# Patient Record
Sex: MALE | URBAN - METROPOLITAN AREA
[De-identification: names, ages, dates, MRNs, and addresses within clinical notes are randomized per-mention and may not be internally consistent; named-entity substitution may affect disease eponyms.]

---

## 2017-04-05 ENCOUNTER — IMPORTED ENCOUNTER (OUTPATIENT)
Dept: URBAN - METROPOLITAN AREA CLINIC 59 | Facility: CLINIC | Age: 52
End: 2017-04-05

## 2017-04-05 PROBLEM — L71.9 ROSACEA, UNSPECIFIED: Noted: 2017-04-05

## 2017-04-05 PROBLEM — H00.14 MEIBOMIAN GLAND CYST OF LEFT UPPER EYELID: Noted: 2017-04-05

## 2017-04-05 PROCEDURE — 99203 OFFICE O/P NEW LOW 30 MIN: CPT

## 2017-11-21 ENCOUNTER — APPOINTMENT (RX ONLY)
Dept: URBAN - METROPOLITAN AREA CLINIC 347 | Facility: CLINIC | Age: 52
Setting detail: DERMATOLOGY
End: 2017-11-21

## 2017-11-21 DIAGNOSIS — L30.8 OTHER SPECIFIED DERMATITIS: ICD-10-CM

## 2017-11-21 PROCEDURE — ? COUNSELING

## 2017-11-21 PROCEDURE — ? TREATMENT REGIMEN

## 2017-11-21 PROCEDURE — ? PRESCRIPTION

## 2017-11-21 PROCEDURE — 99202 OFFICE O/P NEW SF 15 MIN: CPT

## 2017-11-21 RX ORDER — CLOBETASOL PROPIONATE 0.5 MG/G
OINTMENT TOPICAL
Qty: 1 | Refills: 3 | Status: ERX | COMMUNITY
Start: 2017-11-21

## 2017-11-21 RX ADMIN — CLOBETASOL PROPIONATE: 0.5 OINTMENT TOPICAL at 21:16

## 2017-11-21 ASSESSMENT — LOCATION SIMPLE DESCRIPTION DERM
LOCATION SIMPLE: RIGHT HAND
LOCATION SIMPLE: LEFT HAND

## 2017-11-21 ASSESSMENT — LOCATION DETAILED DESCRIPTION DERM
LOCATION DETAILED: RIGHT ULNAR DORSAL HAND
LOCATION DETAILED: LEFT ULNAR DORSAL HAND

## 2017-11-21 ASSESSMENT — LOCATION ZONE DERM: LOCATION ZONE: HAND

## 2017-11-21 NOTE — PROCEDURE: TREATMENT REGIMEN
Plan: Cooler quicker showers
Initiate Treatment: Clobetasol ointment twice daily x 2 weeks then every other day
Otc Regimen: Cerave healing ointment several times daily
Samples Given: Cerave healing ointment
Detail Level: Zone

## 2023-10-21 ASSESSMENT — TONOMETRY
OS_IOP_MMHG: 14
OD_IOP_MMHG: 14

## 2023-10-21 ASSESSMENT — VISUAL ACUITY
OD_SC: 20/20-1
OS_SC: 20/30

## 2024-12-13 ENCOUNTER — NEW PATIENT COMPREHENSIVE (OUTPATIENT)
Dept: URBAN - METROPOLITAN AREA CLINIC 59 | Facility: CLINIC | Age: 59
End: 2024-12-13

## 2024-12-13 DIAGNOSIS — D23.111: ICD-10-CM

## 2024-12-13 DIAGNOSIS — Z98.890: ICD-10-CM

## 2024-12-13 DIAGNOSIS — H43.393: ICD-10-CM

## 2024-12-13 PROCEDURE — 99203 OFFICE O/P NEW LOW 30 MIN: CPT

## 2024-12-13 ASSESSMENT — TONOMETRY
OS_IOP_MMHG: 14
OD_IOP_MMHG: 14

## 2024-12-13 ASSESSMENT — VISUAL ACUITY
OD_SC: 20/20
OS_SC: 20/25-2